# Patient Record
Sex: MALE | Race: WHITE | NOT HISPANIC OR LATINO | Employment: OTHER | URBAN - NONMETROPOLITAN AREA
[De-identification: names, ages, dates, MRNs, and addresses within clinical notes are randomized per-mention and may not be internally consistent; named-entity substitution may affect disease eponyms.]

---

## 2022-09-13 ENCOUNTER — HOSPITAL ENCOUNTER (OUTPATIENT)
Dept: GENERAL RADIOLOGY | Facility: HOSPITAL | Age: 72
Discharge: HOME | End: 2022-09-13

## 2022-09-13 ENCOUNTER — OFFICE VISIT (OUTPATIENT)
Dept: NEUROSURGERY | Facility: CLINIC | Age: 72
End: 2022-09-13

## 2022-09-13 VITALS — WEIGHT: 124 LBS | HEIGHT: 67 IN | BODY MASS INDEX: 19.46 KG/M2

## 2022-09-13 DIAGNOSIS — M51.37 DEGENERATION OF LUMBAR OR LUMBOSACRAL INTERVERTEBRAL DISC: ICD-10-CM

## 2022-09-13 DIAGNOSIS — M50.30 DEGENERATION OF CERVICAL INTERVERTEBRAL DISC: ICD-10-CM

## 2022-09-13 DIAGNOSIS — M48.062 SPINAL STENOSIS, LUMBAR REGION, WITH NEUROGENIC CLAUDICATION: ICD-10-CM

## 2022-09-13 DIAGNOSIS — M48.02 SPINAL STENOSIS IN CERVICAL REGION: ICD-10-CM

## 2022-09-13 DIAGNOSIS — Z72.0 TOBACCO USE: ICD-10-CM

## 2022-09-13 DIAGNOSIS — M41.9 SCOLIOSIS, UNSPECIFIED SCOLIOSIS TYPE, UNSPECIFIED SPINAL REGION: ICD-10-CM

## 2022-09-13 DIAGNOSIS — M48.02 SPINAL STENOSIS IN CERVICAL REGION: Primary | ICD-10-CM

## 2022-09-13 PROCEDURE — 72052 X-RAY EXAM NECK SPINE 6/>VWS: CPT

## 2022-09-13 PROCEDURE — 99204 OFFICE O/P NEW MOD 45 MIN: CPT | Performed by: NURSE PRACTITIONER

## 2022-09-13 PROCEDURE — 72082 X-RAY EXAM ENTIRE SPI 2/3 VW: CPT

## 2022-09-13 PROCEDURE — 72114 X-RAY EXAM L-S SPINE BENDING: CPT

## 2022-09-13 RX ORDER — LISINOPRIL 10 MG/1
10 TABLET ORAL DAILY
COMMUNITY

## 2022-09-13 NOTE — PROGRESS NOTES
Chief complaint:   Chief Complaint   Patient presents with   • Neck Pain     Pt is here for a evaluation for his neck, low back pain and right shoulder pain. This has been going on for years but it's getting worse. Pt has tried physical therapy in the past and it didn't work. He still does the stretches at home. He's also tried pain management and he doesn't like them sticking him with needles. Chiropractic care in the past as well and that didn't help and sometimes it was worse afterwards.    • Back Pain   • Shoulder Pain       Subjective     HPI: This is a 71-year-old male gentleman who was referred to us by Dr. Guillaume dugan for neck and back pain.  He is here to be evaluated today.  Patient says that he has had neck pain for years.  The pain is constant in his neck.  Is worse whenever he is looking up or looking down.  It is better with heat.  He is not really complaining of any upper extremity radicular pain but does describe some numbness and tingling in his right upper extremity.  He has been using a cane for the last year to help mobilize.  He denies any bowel or bladder incontinence.  He has not done any recent physical therapy or chiropractic care pain management injections for his neck.    Patient is also complaining of low back pain.  This pain is constant.  It is worse with sleeping on his side or his stomach.  It is better with heat.  He has pain that radiates to his legs bilaterally in a posterior radicular fashion to his feet that is intermittent with the left leg being worse than the right.  The pain is worse with standing and walking and better with sitting down.  He has not done any recent physical therapy or chiropractic care pain management injections for his back.  He is left-hand-dominant.  He retired in 2005.  He does smoke half a pack of cigarettes a day.  He is single.  Denies any alcohol or illicit drug use.  He states that he has tried Mobic in the past but did not tolerate it as it  "upset his stomach.  The was given a prescription for gabapentin but never got it due to concern for side effects.  He has met with 2 different pain management doctors in the past but never moved forward with the injection due to concern for side effects from the injections.    Review of Systems   Constitutional: Positive for activity change and appetite change.   Musculoskeletal: Positive for back pain, neck pain and neck stiffness.   Neurological: Positive for numbness and headaches.   All other systems reviewed and are negative.       Past Medical History:   Diagnosis Date   • Hypertension      Past Surgical History:   Procedure Laterality Date   • KNEE ARTHROSCOPY W/ MENISCAL REPAIR Left    • KNEE SURGERY Right    • WRIST SURGERY Left      History reviewed. No pertinent family history.  Social History     Tobacco Use   • Smoking status: Current Every Day Smoker     Packs/day: 0.50   Vaping Use   • Vaping Use: Never used   Substance Use Topics   • Alcohol use: Not Currently   • Drug use: Never     (Not in a hospital admission)    Allergies:  Patient has no known allergies.    Objective      Vital Signs  Ht 170.2 cm (67\")   Wt 56.2 kg (124 lb)   BMI 19.42 kg/m²     Physical Exam  Constitutional:       Appearance: Normal appearance. He is well-developed.   HENT:      Head: Normocephalic.   Eyes:      General: Lids are normal.      Conjunctiva/sclera: Conjunctivae normal.      Pupils: Pupils are equal, round, and reactive to light.   Cardiovascular:      Rate and Rhythm: Normal rate and regular rhythm.   Pulmonary:      Effort: Pulmonary effort is normal.      Breath sounds: Normal breath sounds.   Musculoskeletal:         General: Normal range of motion.      Cervical back: Normal range of motion.      Comments: Neck and back pain   Skin:     General: Skin is warm.   Neurological:      Mental Status: He is alert and oriented to person, place, and time.      GCS: GCS eye subscore is 4. GCS verbal subscore is 5. GCS " motor subscore is 6.      Cranial Nerves: No cranial nerve deficit.      Sensory: No sensory deficit.      Deep Tendon Reflexes: Reflexes are normal and symmetric. Reflexes normal.   Psychiatric:         Speech: Speech normal.         Behavior: Behavior normal.         Thought Content: Thought content normal.         Neurologic Exam     Mental Status   Oriented to person, place, and time.   Speech: speech is normal     Cranial Nerves     CN III, IV, VI   Pupils are equal, round, and reactive to light.      Imaging review: MRI of the cervical spine that was done on January 26, 2022 at [facility] does show anterior listhesis of C3-4 with bilateral foraminal narrowing.  At C4-5 there is right-sided foraminal narrowing.  At C5-6 disc degeneration with retrolisthesis and mild cervical stenosis.  C6-7 disc degeneration with bilateral foraminal narrowing and retrolisthesis.  No fracture visualized.  No cord signal change.        MRI of the lumbar spine that was done on January 26, 2022 shows disc degeneration at L5-S1 with bilateral foraminal narrowing.  At L4-5 disc degeneration with Modic endplate changes and lumbar stenosis with bilateral foraminal narrowing.  L3-4 lumbar stenosis with bilateral foraminal narrowing.  At L2-3 lumbar disc degeneration with Modic endplate changes and bilateral foraminal stenosis.  At L1-2 bilateral foraminal narrowing.  There is degenerative scoliosis noted in the lumbar spine.  The conus does terminate at L1.  No fracture visualized.  No cord signal change    Assessment/Plan:   1.  Neck pain  The patient does have cervical disc degeneration cervical stenosis.  I am going to send him for set of x-rays of the cervical spine for flexion and extension.  We will also start him into a dedicated course of physical therapy consisting of 2-3 times a week for 4 to 6 weeks.    2.  Back pain  I am going to send the patient for set of x-rays of the lumbar spine with flexion and  extension as well as scoliosis x-rays.  I will have him go for dedicated course of physical therapy consisting of 2-3 times a week for 4 to 6 weeks.    We will have him follow-up with Dr. Dejesus at the next available appointment to see how he is doing from the physical therapy.  He was told that if he did not make any progress from the therapy to call us and we could discuss pain management injections.  His questions and concerns were addressed  Patient is a smoker. Smoking cessation classes given to the patient  The patient's Body mass index is 19.42 kg/m².. BMI is within normal parameters. No follow-up required.  Advance Care Planning   ACP discussion was held with the patient during this visit. Patient does not have an advance directive, information provided.   STEADI Fall Risk Assessment was completed, and patient is at MODERATE risk for falls. Assessment completed on:9/13/2022       Diagnoses and all orders for this visit:    1. Spinal stenosis in cervical region (Primary)  -     XR Spine Cervical Complete With Obli Flex Ext  -     XR Spine Lumbar Complete With Flex & Ext  -     XR Spine Scoliosis 2 or 3 Views; Future  -     Ambulatory Referral to Physical Therapy Evaluate and treat    2. Degeneration of cervical intervertebral disc  -     XR Spine Cervical Complete With Obli Flex Ext  -     XR Spine Lumbar Complete With Flex & Ext  -     XR Spine Scoliosis 2 or 3 Views; Future  -     Ambulatory Referral to Physical Therapy Evaluate and treat    3. Degeneration of lumbar or lumbosacral intervertebral disc  -     XR Spine Cervical Complete With Obli Flex Ext  -     XR Spine Lumbar Complete With Flex & Ext  -     XR Spine Scoliosis 2 or 3 Views; Future  -     Ambulatory Referral to Physical Therapy Evaluate and treat    4. Spinal stenosis, lumbar region, with neurogenic claudication  -     XR Spine Cervical Complete With Obli Flex Ext  -     XR Spine Lumbar Complete With Flex & Ext  -     XR Spine Scoliosis 2 or  3 Views; Future  -     Ambulatory Referral to Physical Therapy Evaluate and treat    5. Scoliosis, unspecified scoliosis type, unspecified spinal region  -     XR Spine Cervical Complete With Obli Flex Ext  -     XR Spine Lumbar Complete With Flex & Ext  -     XR Spine Scoliosis 2 or 3 Views; Future  -     Ambulatory Referral to Physical Therapy Evaluate and treat    6. Tobacco use          I discussed the patients findings and my recommendations with patient    César Nazario, APRN  09/13/22  13:52 CDT

## 2022-09-13 NOTE — PATIENT INSTRUCTIONS
Steps to Quit Smoking  Smoking tobacco is the leading cause of preventable death. It can affect almost every organ in the body. Smoking puts you and those around you at risk for developing many serious chronic diseases. Quitting smoking can be difficult, but it is one of the best things that you can do for your health. It is never too late to quit.  How do I get ready to quit?  When you decide to quit smoking, create a plan to help you succeed. Before you quit:  · Pick a date to quit. Set a date within the next 2 weeks to give you time to prepare.  · Write down the reasons why you are quitting. Keep this list in places where you will see it often.  · Tell your family, friends, and co-workers that you are quitting. Support from your loved ones can make quitting easier.  · Talk with your health care provider about your options for quitting smoking.  · Find out what treatment options are covered by your health insurance.  · Identify people, places, things, and activities that make you want to smoke (triggers). Avoid them.  What first steps can I take to quit smoking?  · Throw away all cigarettes at home, at work, and in your car.  · Throw away smoking accessories, such as ashtrays and lighters.  · Clean your car. Make sure to empty the ashtray.  · Clean your home, including curtains and carpets.  What strategies can I use to quit smoking?  Talk with your health care provider about combining strategies, such as taking medicines while you are also receiving in-person counseling. Using these two strategies together makes you more likely to succeed in quitting than if you used either strategy on its own.  · If you are pregnant or breastfeeding, talk with your health care provider about finding counseling or other support strategies to quit smoking. Do not take medicine to help you quit smoking unless your health care provider tells you to do so.  To quit smoking:  Quit right away  · Quit smoking completely, instead of  gradually reducing how much you smoke over a period of time. Research shows that stopping smoking right away is more successful than gradually quitting.  · Attend in-person counseling to help you build problem-solving skills. You are more likely to succeed in quitting if you attend counseling sessions regularly. Even short sessions of 10 minutes can be effective.  Take medicine  You may take medicines to help you quit smoking. Some medicines require a prescription and some you can purchase over-the-counter. Medicines may have nicotine in them to replace the nicotine in cigarettes. Medicines may:  · Help to stop cravings.  · Help to relieve withdrawal symptoms.  Your health care provider may recommend:  · Nicotine patches, gum, or lozenges.  · Nicotine inhalers or sprays.  · Non-nicotine medicine that is taken by mouth.  Find resources  Find resources and support systems that can help you to quit smoking and remain smoke-free after you quit. These resources are most helpful when you use them often. They include:  · Online chats with a counselor.  · Telephone quitlines.  · Printed self-help materials.  · Support groups or group counseling.  · Text messaging programs.  · Mobile phone apps or applications. Use apps that can help you stick to your quit plan by providing reminders, tips, and encouragement. There are many free apps for mobile devices as well as websites. Examples include Quit Guide from the CDC and smokefree.gov  What things can I do to make it easier to quit?    · Reach out to your family and friends for support and encouragement. Call telephone quitlines (6-349-QUIT-NOW), reach out to support groups, or work with a counselor for support.  · Ask people who smoke to avoid smoking around you.  · Avoid places that trigger you to smoke, such as bars, parties, or smoke-break areas at work.  · Spend time with people who do not smoke.  · Lessen the stress in your life. Stress can be a smoking trigger for some  people. To lessen stress, try:  ? Exercising regularly.  ? Doing deep-breathing exercises.  ? Doing yoga.  ? Meditating.  ? Performing a body scan. This involves closing your eyes, scanning your body from head to toe, and noticing which parts of your body are particularly tense. Try to relax the muscles in those areas.  How will I feel when I quit smoking?  Day 1 to 3 weeks  Within the first 24 hours of quitting smoking, you may start to feel withdrawal symptoms. These symptoms are usually most noticeable 2-3 days after quitting, but they usually do not last for more than 2-3 weeks. You may experience these symptoms:  · Mood swings.  · Restlessness, anxiety, or irritability.  · Trouble concentrating.  · Dizziness.  · Strong cravings for sugary foods and nicotine.  · Mild weight gain.  · Constipation.  · Nausea.  · Coughing or a sore throat.  · Changes in how the medicines that you take for unrelated issues work in your body.  · Depression.  · Trouble sleeping (insomnia).  Week 3 and afterward  After the first 2-3 weeks of quitting, you may start to notice more positive results, such as:  · Improved sense of smell and taste.  · Decreased coughing and sore throat.  · Slower heart rate.  · Lower blood pressure.  · Clearer skin.  · The ability to breathe more easily.  · Fewer sick days.  Quitting smoking can be very challenging. Do not get discouraged if you are not successful the first time. Some people need to make many attempts to quit before they achieve long-term success. Do your best to stick to your quit plan, and talk with your health care provider if you have any questions or concerns.  Summary  · Smoking tobacco is the leading cause of preventable death. Quitting smoking is one of the best things that you can do for your health.  · When you decide to quit smoking, create a plan to help you succeed.  · Quit smoking right away, not slowly over a period of time.  · When you start quitting, seek help from your  health care provider, family, or friends.  This information is not intended to replace advice given to you by your health care provider. Make sure you discuss any questions you have with your health care provider.  Document Revised: 09/11/2020 Document Reviewed: 03/07/2020  Elsevier Patient Education © 2021 [employer]

## 2023-01-23 ENCOUNTER — TELEPHONE (OUTPATIENT)
Dept: NEUROSURGERY | Facility: CLINIC | Age: 73
End: 2023-01-23
Payer: OTHER GOVERNMENT

## 2023-01-23 NOTE — TELEPHONE ENCOUNTER
Patient's girlfriend called and states the patient is in too much pain to travel to the appt tomorrow & would like to cancel.  She said they would call back & reschedule when he was feeling better.  I explained that Dr Lopez's next opening would likely be July and she said okay.    NI OBRIEN [facility]  PHYSICIAN LEAD  DR CAROLINA LOPEZ  [facility]